# Patient Record
Sex: FEMALE | Race: BLACK OR AFRICAN AMERICAN | NOT HISPANIC OR LATINO | Employment: FULL TIME | URBAN - METROPOLITAN AREA
[De-identification: names, ages, dates, MRNs, and addresses within clinical notes are randomized per-mention and may not be internally consistent; named-entity substitution may affect disease eponyms.]

---

## 2024-11-19 ENCOUNTER — HOSPITAL ENCOUNTER (EMERGENCY)
Facility: HOSPITAL | Age: 24
Discharge: HOME | End: 2024-11-19
Attending: EMERGENCY MEDICINE
Payer: MEDICAID

## 2024-11-19 VITALS
SYSTOLIC BLOOD PRESSURE: 104 MMHG | BODY MASS INDEX: 24.8 KG/M2 | OXYGEN SATURATION: 100 % | TEMPERATURE: 98 F | HEIGHT: 63 IN | DIASTOLIC BLOOD PRESSURE: 59 MMHG | WEIGHT: 140 LBS | HEART RATE: 84 BPM | RESPIRATION RATE: 16 BRPM

## 2024-11-19 DIAGNOSIS — M79.673 FOOT PAIN: ICD-10-CM

## 2024-11-19 DIAGNOSIS — S93.601A SPRAIN OF RIGHT FOOT, INITIAL ENCOUNTER: Primary | ICD-10-CM

## 2024-11-19 LAB
B-HCG UR QL: NEGATIVE
CTP QC/QA: YES

## 2024-11-19 PROCEDURE — 99284 EMERGENCY DEPT VISIT MOD MDM: CPT | Mod: 25

## 2024-11-19 PROCEDURE — 81025 URINE PREGNANCY TEST: CPT

## 2024-11-19 RX ORDER — METHOCARBAMOL 500 MG/1
1000 TABLET, FILM COATED ORAL 3 TIMES DAILY
Qty: 30 TABLET | Refills: 0 | Status: SHIPPED | OUTPATIENT
Start: 2024-11-19 | End: 2024-11-24

## 2024-11-19 RX ORDER — KETOROLAC TROMETHAMINE 10 MG/1
10 TABLET, FILM COATED ORAL
Status: DISCONTINUED | OUTPATIENT
Start: 2024-11-19 | End: 2024-11-19 | Stop reason: HOSPADM

## 2024-11-19 RX ORDER — NAPROXEN 500 MG/1
500 TABLET ORAL 2 TIMES DAILY
Qty: 30 TABLET | Refills: 0 | Status: SHIPPED | OUTPATIENT
Start: 2024-11-19

## 2024-11-19 RX ORDER — LIDOCAINE 50 MG/G
1 PATCH TOPICAL DAILY
Qty: 15 PATCH | Refills: 0 | Status: SHIPPED | OUTPATIENT
Start: 2024-11-19

## 2024-11-19 NOTE — ED TRIAGE NOTES
Pt presents to ER with complaints of right foot pain after twisting it on yesterday. Pt rates pain 4/10 when sitting and 9/10 when walking. Pt denies any other issues at this time.

## 2024-11-19 NOTE — ED PROVIDER NOTES
Encounter Date: 11/19/2024       History     Chief Complaint   Patient presents with    Foot Pain     Pt c/o pain to right foot after rolling her ankle and then falling onto her foot yesterday.      The history is provided by the patient and medical records.   23-year-old female with no pertinent past medical history presenting to the emergency department today for evaluation of right foot pain after rolling her foot and ankle yesterday.  Reports when walking accidentally rolling her foot and since then has been having swelling and pain to the outside of her foot.  States she was able to walk however it was painful in his limping.  No medications taken for his symptoms prior to arrival.  Denies any extremity paresthesias, weakness, fever or other associated symptoms.  Review of patient's allergies indicates:   Allergen Reactions    Fire ant Hives     History reviewed. No pertinent past medical history.  History reviewed. No pertinent surgical history.  No family history on file.  Social History     Tobacco Use    Smoking status: Never    Smokeless tobacco: Never   Substance Use Topics    Alcohol use: Yes    Drug use: Yes     Types: Marijuana     Review of Systems   Constitutional:  Negative for chills, diaphoresis, fatigue and fever.   HENT:  Negative for congestion, rhinorrhea and sore throat.    Eyes:  Negative for redness and visual disturbance.   Respiratory:  Negative for cough and shortness of breath.    Cardiovascular:  Negative for chest pain, palpitations and leg swelling.   Gastrointestinal:  Negative for abdominal pain, diarrhea, nausea and vomiting.   Genitourinary:  Negative for difficulty urinating, dysuria, flank pain and frequency.   Musculoskeletal:  Positive for myalgias (right foot). Negative for arthralgias, back pain, neck pain and neck stiffness.   Skin:  Negative for rash.   Neurological:  Negative for dizziness, weakness, light-headedness and headaches.   Hematological:  Does not bruise/bleed  easily.       Physical Exam     Initial Vitals [11/19/24 1730]   BP Pulse Resp Temp SpO2   104/68 104 16 98.1 °F (36.7 °C) 98 %      MAP       --         Physical Exam    Nursing note and vitals reviewed.  Constitutional: She appears well-developed and well-nourished. She is not diaphoretic. No distress.   HENT:   Head: Normocephalic and atraumatic.   Right Ear: External ear normal.   Left Ear: External ear normal.   Nose: Nose normal. Mouth/Throat: Oropharynx is clear and moist.   Eyes: Conjunctivae and EOM are normal. Pupils are equal, round, and reactive to light. Right eye exhibits no discharge. Left eye exhibits no discharge.   Neck: Neck supple.   Normal range of motion.   Full passive range of motion without pain.     Cardiovascular:  Normal rate, regular rhythm, normal heart sounds and normal pulses.     Exam reveals no distant heart sounds and no friction rub.       Pulmonary/Chest: Effort normal and breath sounds normal. No respiratory distress.   Abdominal: Abdomen is soft. Bowel sounds are normal. She exhibits no distension, no pulsatile midline mass and no mass. There is no splenomegaly or hepatomegaly. There is no abdominal tenderness.   No right CVA tenderness.  No left CVA tenderness. There is no rebound and no guarding.   Musculoskeletal:         General: Normal range of motion.      Right shoulder: Normal.      Left shoulder: Normal.      Right elbow: Normal.      Left elbow: Normal.      Right wrist: Normal.      Left wrist: Normal.      Right hand: Normal.      Left hand: Normal.      Cervical back: Normal, full passive range of motion without pain, normal range of motion and neck supple.      Thoracic back: Normal.      Lumbar back: Normal.      Right hip: Normal.      Left hip: Normal.      Right knee: Normal.      Left knee: Normal.      Right lower leg: Normal.      Left lower leg: Normal.      Right ankle: Normal.      Left ankle: Normal.      Right foot: Normal range of motion and normal  capillary refill. Swelling (lateral aspect mid 5th MT), tenderness and bony tenderness present. No deformity, bunion, Charcot foot, foot drop, prominent metatarsal heads, laceration or crepitus. Normal pulse.      Left foot: Normal.        Feet:       Comments: Focused exam of the right foot:  Full range of motion with 5/5 strength against resistance 2+ dorsal pedal pulses.  There it was mild swelling to the lateral aspect over the 5th metatarsal with localized tenderness.     Neurological: She is alert and oriented to person, place, and time. She has normal strength. No cranial nerve deficit or sensory deficit. Gait normal.   Skin: Skin is warm and dry. Capillary refill takes less than 2 seconds. No bruising, no ecchymosis and no rash noted. No pallor.   Psychiatric: She has a normal mood and affect. Her speech is normal and behavior is normal. Thought content normal.         ED Course   Procedures  Labs Reviewed   POCT URINE PREGNANCY       Result Value    POC Preg Test, Ur Negative       Acceptable Yes            Imaging Results              X-Ray Foot Complete Right (Final result)  Result time 11/19/24 19:30:35      Final result by Clyde Howe MD (11/19/24 19:30:35)                   Impression:      1. No acute displaced fracture or dislocation of the foot.      Electronically signed by: Clyde Howe MD  Date:    11/19/2024  Time:    19:30               Narrative:    EXAMINATION:  XR FOOT COMPLETE 3 VIEW RIGHT    CLINICAL HISTORY:  . Pain in unspecified foot    TECHNIQUE:  AP, lateral, and oblique views of the right foot were performed.    COMPARISON:  None    FINDINGS:  Three views right foot.    No acute displaced fracture or dislocation of the foot.  No radiopaque foreign body.  No significant edema.                                       Medications   ketorolac tablet 10 mg (10 mg Oral Not Given 11/19/24 1800)     Medical Decision Making  23-year-old female with no pertinent past  medical history presenting to the emergency department today for evaluation of right foot pain after rolling her foot and ankle yesterday.  Reports when walking accidentally rolling her foot and since then has been having swelling and pain to the outside of her foot.  States she was able to walk however it was painful in his limping.  No medications taken for his symptoms prior to arrival.  Denies any extremity paresthesias, weakness, fever or other associated symptoms.      Patient's chart and medical history reviewed.  Patient's vitals reviewed.  They are afebrile, no respiratory distress, nontoxic-appearing in the ED.  Differential diagnosis is considered the following.  - Septic Arthritis, Gout, Osteomyelitis: considered with pain, although unlikely without overlying erythema and swelling/edema, patient is afebrile  - Fracture/Dislocation: considered with pain, imaging ordered for further evaluation  - Contusion/Sprain/Strain: considered with pain with ROM and contusion to area.  - Compartment Syndrome: unlikely with 2+ distal pulses, no pallor, no paresthesias, no woody induration.   Per my interpretation no acute osseous abnormality including fracture or dislocation of the patient's foot.  Discussed rice therapy.  Ace wrap applied.  Plan to discharged home with medications for pain as below.  At this time I'll discharge home to follow up with primary care physician in the next 1-2 days for further evaluation.  If the pain continues the pt will need to see ortho for further evaluation.  The patient is comfortable with this plan and comfortable going home at this time. After taking into careful account the historical factors and physical exam findings of the patient's presentation today, in conjunction with the empirical and objective data obtained on ED workup, no acute emergent medical condition has been identified. The patient appears to be low risk for an emergent medical condition and I feel it is safe and  appropriate at this time for the patient to be discharged to follow-up as detailed in their discharge instructions for reevaluation and possible continued outpatient workup and management. I have discussed the specifics of the workup with the patient and the patient has verbalized understanding of the details of the workup, the diagnosis, the treatment plan, and the need for outpatient follow-up.  Although the patient has no emergent etiology today this does not preclude the development of an emergent condition so in addition, I have advised the patient that they can return to the ED and/or activate EMS at any time with worsening of their symptoms, change of their symptoms, or with any other medical complaint.  The patient remained comfortable and stable during their visit in the ED.  Discharge and follow-up instructions discussed with the patient who expressed understanding and willingness to comply with my recommendations. I discussed with the patient/family the diagnosis, treatment plan, indications for return to the emergency department, and for expected follow-up. Please follow up with your primary doctor in 1-2 days and return to the ED in any new, worsening, or continued symptoms. The patient/family verbalized an understanding. The patient/family is asked if there are any questions or concerns. We discuss the case, until all issues are addressed to the patient/family's satisfaction. Patient/family understands and is agreeable to the plan.    AGNES DAMIAN PA-C    DISCLAIMER: This note was prepared with [employer] voice recognition transcription software. Garbled syntax, mangled pronouns, and other bizarre constructions may be attributed to that software system.      Amount and/or Complexity of Data Reviewed  Labs: ordered. Decision-making details documented in ED Course.  Radiology: ordered and independent interpretation performed.    Risk  Prescription drug management.               ED Course as of 11/19/24  1949   Tue Nov 19, 2024   1900 hCG Qualitative, Urine: Negative [AF]      ED Course User Index  [AF] Garrett Peña PA-C                           Clinical Impression:  Final diagnoses:  [M79.673] Foot pain  [S93.601A] Sprain of right foot, initial encounter (Primary)          ED Disposition Condition    Discharge Stable          ED Prescriptions       Medication Sig Dispense Start Date End Date Auth. Provider    naproxen (NAPROSYN) 500 MG tablet Take 1 tablet (500 mg total) by mouth 2 (two) times daily. 30 tablet 11/19/2024 -- Garrett Peña PA-C    methocarbamoL (ROBAXIN) 500 MG Tab Take 2 tablets (1,000 mg total) by mouth 3 (three) times daily. for 5 days 30 tablet 11/19/2024 11/24/2024 Garrett Peña PA-C    LIDOcaine (LIDODERM) 5 % Place 1 patch onto the skin once daily. Apply patch for 12 hours and then leave off for 12 hours 15 patch 11/19/2024 -- Garrett Peña PA-C          Follow-up Information       Follow up With Specialties Details Why Contact Info    [place] -  Schedule an appointment as soon as possible for a visit in 1 day for follow up if you do not currently have a  [address] [place] [address]  [phone]      Your primary care physician  Schedule an appointment as soon as possible for a visit in 1 day for follow up     [facility] Dept Emergency Medicine Go to  If symptoms worsen [address] [facility] [place] [id number] [phone]             Garrett Peña PA-C  11/19/24 1949

## 2024-11-19 NOTE — Clinical Note
"Calista Pina"Hussain was seen and treated in our emergency department on 11/19/2024.  She may return to work on 11/23/2024.       If you have any questions or concerns, please don't hesitate to call.      Garrett Peña PA-C"

## 2024-11-20 NOTE — DISCHARGE INSTRUCTIONS
Apply a compressive ACE bandage. Rest and elevate the affected painful area.  Apply cold compresses intermittently as needed.  As pain recedes, begin normal activities slowly as tolerated.  Call if symptoms persist.  Thank you for coming to our Emergency Department today. It is important to remember that some problems or medical conditions are difficult to diagnose and may not be found or addressed during your Emergency Department visit.  These conditions often start with non-specific symptoms and can only be diagnosed on follow up visits with your primary care physician or specialist when the symptoms continue or change. Please remember that all medical conditions can change, and we cannot predict how you will be feeling tomorrow or the next day. Return to the ER with any questions/concerns, new/concerning symptoms including fever, chest pain, shortness of breath, loss of consciousness, dizziness, weakness, worsening symptoms, failure to improve, or any other concerns. Also, please follow up with your Primary Care Physician and/or Pediatrician in the next 1-2 days to review your ED visit in entirety and for re-evaluation.   Be sure to follow up with your primary care doctor and review all labs/imaging/tests that were performed during your ER visit with them. It is very common for us to identify non-emergent incidental findings which must be followed up with your primary care physician.  Some labs/imaging/tests may be outside of the normal range, and require non-emergent follow-up and/or further investigation/treatment/procedures/testing to help diagnose/exclude/prevent complications or other potentially serious medical conditions. Some abnormalities may not have been discussed or addressed during your ER visit. Some lab results may not return during your ER visit but can be accessible by downloading the free [facility] faisal or by visiting [email or web address] . It is important for you to review all  labs/imaging/tests which are outside of the normal range with your physician.  An ER visit does not replace a primary care visit, and many screening tests or follow-up tests cannot be ordered by an ER doctor or performed by the ER. Some tests may even require pre-approval.  If you do not have a primary care doctor, you may contact the one listed on your discharge paperwork or you may also call the [facility] Appointment Desk at [phone] , or [address] [place] [address] at  [phone] to schedule an appointment, or establish care with a primary care doctor or even a specialist and to obtain information about local resources. It is important to your health that you have a primary care doctor.  Please take all medications as directed. We have done our best to select a medication for you that will treat your condition however, all medications may potentially have side-effects and it is impossible to predict which medications may give you side-effects or what those side-effects (if any) those medications may give you.  If you feel that you are having a negative effect or side-effect of any medication you should stop taking those medications immediately and seek medical attention. If you feel that you are having a life-threatening reaction call 911.  Do not drive, swim, climb to height, take a bath, operate heavy machinery, drink alcohol or take potentially sedating medications, sign any legal documents or make any important decisions for 24 hours if you have received any pain medications, sedatives or mood altering drugs during your ER visit or within 24 hours of taking them if they have been prescribed to you.   You can find additional resources for Dentists, hearing aids, durable medical equipment, low cost pharmacies and other resources at [email or web address]  Patient agrees with this plan. Discussed with her strict return precautions, they verbalized understanding. Patient is stable for discharge.   § Please take  all medication as prescribed.

## 2025-01-31 ENCOUNTER — HOSPITAL ENCOUNTER (EMERGENCY)
Facility: HOSPITAL | Age: 25
Discharge: HOME | End: 2025-01-31
Attending: EMERGENCY MEDICINE
Payer: COMMERCIAL

## 2025-01-31 VITALS
WEIGHT: 140 LBS | HEART RATE: 89 BPM | TEMPERATURE: 99 F | RESPIRATION RATE: 20 BRPM | OXYGEN SATURATION: 98 % | SYSTOLIC BLOOD PRESSURE: 110 MMHG | BODY MASS INDEX: 24.8 KG/M2 | HEIGHT: 63 IN | DIASTOLIC BLOOD PRESSURE: 65 MMHG

## 2025-01-31 DIAGNOSIS — A08.4 VIRAL GASTROENTERITIS: Primary | ICD-10-CM

## 2025-01-31 LAB
ALBUMIN SERPL BCP-MCNC: 3.9 G/DL (ref 3.5–5.2)
ALP SERPL-CCNC: 70 U/L (ref 40–150)
ALT SERPL W/O P-5'-P-CCNC: 12 U/L (ref 10–44)
ANION GAP SERPL CALC-SCNC: 6 MMOL/L (ref 8–16)
AST SERPL-CCNC: 19 U/L (ref 10–40)
B-HCG UR QL: NEGATIVE
BASOPHILS # BLD AUTO: 0.02 K/UL (ref 0–0.2)
BASOPHILS NFR BLD: 0.4 % (ref 0–1.9)
BILIRUB SERPL-MCNC: 0.6 MG/DL (ref 0.1–1)
BILIRUB UR QL STRIP: NEGATIVE
BUN SERPL-MCNC: 8 MG/DL (ref 6–20)
CALCIUM SERPL-MCNC: 9.1 MG/DL (ref 8.7–10.5)
CHLORIDE SERPL-SCNC: 105 MMOL/L (ref 95–110)
CLARITY UR: CLEAR
CO2 SERPL-SCNC: 26 MMOL/L (ref 23–29)
COLOR UR: YELLOW
CREAT SERPL-MCNC: 0.8 MG/DL (ref 0.5–1.4)
CTP QC/QA: YES
DIFFERENTIAL METHOD BLD: ABNORMAL
EOSINOPHIL # BLD AUTO: 0 K/UL (ref 0–0.5)
EOSINOPHIL NFR BLD: 0.2 % (ref 0–8)
ERYTHROCYTE [DISTWIDTH] IN BLOOD BY AUTOMATED COUNT: 13.7 % (ref 11.5–14.5)
EST. GFR  (NO RACE VARIABLE): >60 ML/MIN/1.73 M^2
GLUCOSE SERPL-MCNC: 101 MG/DL (ref 70–110)
GLUCOSE UR QL STRIP: NEGATIVE
HCT VFR BLD AUTO: 40.3 % (ref 37–48.5)
HGB BLD-MCNC: 14.1 G/DL (ref 12–16)
HGB UR QL STRIP: ABNORMAL
IMM GRANULOCYTES # BLD AUTO: 0.01 K/UL (ref 0–0.04)
IMM GRANULOCYTES NFR BLD AUTO: 0.2 % (ref 0–0.5)
KETONES UR QL STRIP: NEGATIVE
LEUKOCYTE ESTERASE UR QL STRIP: NEGATIVE
LIPASE SERPL-CCNC: 5 U/L (ref 4–60)
LYMPHOCYTES # BLD AUTO: 1.1 K/UL (ref 1–4.8)
LYMPHOCYTES NFR BLD: 23.7 % (ref 18–48)
MCH RBC QN AUTO: 26.7 PG (ref 27–31)
MCHC RBC AUTO-ENTMCNC: 35 G/DL (ref 32–36)
MCV RBC AUTO: 76 FL (ref 82–98)
MONOCYTES # BLD AUTO: 0.5 K/UL (ref 0.3–1)
MONOCYTES NFR BLD: 11.4 % (ref 4–15)
NEUTROPHILS # BLD AUTO: 3 K/UL (ref 1.8–7.7)
NEUTROPHILS NFR BLD: 64.1 % (ref 38–73)
NITRITE UR QL STRIP: NEGATIVE
NRBC BLD-RTO: 0 /100 WBC
PH UR STRIP: 7 [PH] (ref 5–8)
PLATELET # BLD AUTO: 243 K/UL (ref 150–450)
PMV BLD AUTO: 10.3 FL (ref 9.2–12.9)
POC MOLECULAR INFLUENZA A AGN: NEGATIVE
POC MOLECULAR INFLUENZA B AGN: NEGATIVE
POTASSIUM SERPL-SCNC: 3.5 MMOL/L (ref 3.5–5.1)
PROT SERPL-MCNC: 7.7 G/DL (ref 6–8.4)
PROT UR QL STRIP: ABNORMAL
RBC # BLD AUTO: 5.28 M/UL (ref 4–5.4)
SARS-COV-2 RDRP RESP QL NAA+PROBE: NEGATIVE
SODIUM SERPL-SCNC: 137 MMOL/L (ref 136–145)
SP GR UR STRIP: 1.01 (ref 1–1.03)
URN SPEC COLLECT METH UR: ABNORMAL
UROBILINOGEN UR STRIP-ACNC: NEGATIVE EU/DL
WBC # BLD AUTO: 4.73 K/UL (ref 3.9–12.7)

## 2025-01-31 PROCEDURE — 96361 HYDRATE IV INFUSION ADD-ON: CPT

## 2025-01-31 PROCEDURE — 99284 EMERGENCY DEPT VISIT MOD MDM: CPT | Mod: 25

## 2025-01-31 PROCEDURE — 87635 SARS-COV-2 COVID-19 AMP PRB: CPT | Performed by: EMERGENCY MEDICINE

## 2025-01-31 PROCEDURE — 96375 TX/PRO/DX INJ NEW DRUG ADDON: CPT

## 2025-01-31 PROCEDURE — 81025 URINE PREGNANCY TEST: CPT

## 2025-01-31 PROCEDURE — 80053 COMPREHEN METABOLIC PANEL: CPT

## 2025-01-31 PROCEDURE — 81003 URINALYSIS AUTO W/O SCOPE: CPT

## 2025-01-31 PROCEDURE — 85025 COMPLETE CBC W/AUTO DIFF WBC: CPT

## 2025-01-31 PROCEDURE — 83690 ASSAY OF LIPASE: CPT

## 2025-01-31 PROCEDURE — 25000003 PHARM REV CODE 250

## 2025-01-31 PROCEDURE — 63600175 PHARM REV CODE 636 W HCPCS

## 2025-01-31 PROCEDURE — 87502 INFLUENZA DNA AMP PROBE: CPT

## 2025-01-31 PROCEDURE — 96374 THER/PROPH/DIAG INJ IV PUSH: CPT

## 2025-01-31 RX ORDER — FAMOTIDINE 20 MG/1
20 TABLET, FILM COATED ORAL 2 TIMES DAILY
Qty: 20 TABLET | Refills: 0 | Status: SHIPPED | OUTPATIENT
Start: 2025-01-31 | End: 2025-02-10

## 2025-01-31 RX ORDER — ONDANSETRON HYDROCHLORIDE 2 MG/ML
4 INJECTION, SOLUTION INTRAVENOUS
Status: COMPLETED | OUTPATIENT
Start: 2025-01-31 | End: 2025-01-31

## 2025-01-31 RX ORDER — ONDANSETRON 4 MG/1
4 TABLET, ORALLY DISINTEGRATING ORAL EVERY 6 HOURS PRN
Qty: 14 TABLET | Refills: 0 | Status: SHIPPED | OUTPATIENT
Start: 2025-01-31

## 2025-01-31 RX ORDER — SODIUM CHLORIDE 9 MG/ML
1000 INJECTION, SOLUTION INTRAVENOUS
Status: COMPLETED | OUTPATIENT
Start: 2025-01-31 | End: 2025-01-31

## 2025-01-31 RX ORDER — FAMOTIDINE 10 MG/ML
20 INJECTION INTRAVENOUS
Status: COMPLETED | OUTPATIENT
Start: 2025-01-31 | End: 2025-01-31

## 2025-01-31 RX ORDER — ALUMINUM HYDROXIDE, MAGNESIUM HYDROXIDE, AND SIMETHICONE 1200; 120; 1200 MG/30ML; MG/30ML; MG/30ML
30 SUSPENSION ORAL
Status: COMPLETED | OUTPATIENT
Start: 2025-01-31 | End: 2025-01-31

## 2025-01-31 RX ADMIN — ALUMINUM HYDROXIDE, MAGNESIUM HYDROXIDE, AND SIMETHICONE 30 ML: 1200; 120; 1200 SUSPENSION ORAL at 10:01

## 2025-01-31 RX ADMIN — ONDANSETRON 4 MG: 2 INJECTION INTRAMUSCULAR; INTRAVENOUS at 10:01

## 2025-01-31 RX ADMIN — SODIUM CHLORIDE 1000 ML: 9 INJECTION, SOLUTION INTRAVENOUS at 10:01

## 2025-01-31 RX ADMIN — FAMOTIDINE 20 MG: 10 INJECTION, SOLUTION INTRAVENOUS at 10:01

## 2025-01-31 NOTE — ED PROVIDER NOTES
Encounter Date: 1/31/2025    SCRIBE #1 NOTE: I, Rubina Wick, am scribing for, and in the presence of,  Brodie Mccormack PA-C. I have scribed the following portions of the note - Other sections scribed: HPI, ROS, PE.       History     Chief Complaint   Patient presents with    Abdominal Pain     Pt to ER with reports of abd pain with N/V/D x 2 days. Pt reports + dark stool. Pt reports taking kaopectate for symptoms.       Patient is a 24 y.o. female with no past medical history who presents to the Emergency Department for evaluation of intermittent epigastric abdominal pain x 2 days.  She also reports associated symptoms of nausea and 8-10 daily episodes of vomiting and diarrhea. She reports last vomiting episode was at 0730 today. She also reports having dark/black stool this morning but states she has been taking kaopectate for symptoms prior to this. Patient reports eating red beans and rice prior to onset of symptoms. She denies worsening pain with food intake and states she has been trying to drink lots of fluids. She denies history of gallstones, pancreatitis, heavy NSAID use, acid reflux/indigestion, peptic ulcer disease, abdominal surgeries, bowel obstruction. She denies blood in the stool. She denies previous consultation with a GI specialist, EGD, or colonoscopy. She denies crohn's disease or ulcerative colitis. She reports marijuana use but denies any other recreational drug use, states she smokes daily. Denies alcohol consumption or tobacco use. She denies dysuria or hematuria. Denies cough, congestion, or any recent illness.       The history is provided by the patient. No  was used.     Review of patient's allergies indicates:   Allergen Reactions    Fire ant Hives     History reviewed. No pertinent past medical history.  History reviewed. No pertinent surgical history.  No family history on file.  Social History     Tobacco Use    Smoking status: Never    Smokeless tobacco: Never    Substance Use Topics    Alcohol use: Yes    Drug use: Yes     Types: Marijuana     Review of Systems   Constitutional:  Negative for chills and fever.   HENT:  Negative for congestion, ear pain, sore throat and trouble swallowing.    Respiratory:  Negative for cough and shortness of breath.    Cardiovascular:  Negative for chest pain and palpitations.   Gastrointestinal:  Positive for abdominal pain (epigastric), diarrhea, nausea and vomiting. Negative for blood in stool.   Genitourinary:  Negative for dysuria, flank pain, frequency, hematuria, vaginal bleeding and vaginal discharge.   Musculoskeletal:  Negative for back pain, neck pain and neck stiffness.   Neurological:  Negative for dizziness, weakness, light-headedness and headaches.       Physical Exam     Initial Vitals [01/31/25 1001]   BP Pulse Resp Temp SpO2   118/67 105 20 98.7 °F (37.1 °C) 97 %      MAP       --         Physical Exam    Nursing note and vitals reviewed.  Constitutional: She appears well-developed and well-nourished.   HENT:   Head: Normocephalic and atraumatic.   Right Ear: External ear normal.   Left Ear: External ear normal.   Neck: Carotid bruit is not present.   Normal range of motion.  Cardiovascular:  Normal rate, regular rhythm, normal heart sounds and intact distal pulses.     Exam reveals no gallop and no friction rub.       No murmur heard.  Pulmonary/Chest: Breath sounds normal. No respiratory distress. She has no wheezes. She has no rhonchi. She has no rales.   Abdominal: Abdomen is soft. Bowel sounds are normal. She exhibits no distension. There is no abdominal tenderness. There is no rebound and no guarding.   Musculoskeletal:         General: Normal range of motion.      Cervical back: Normal range of motion.     Neurological: She is alert and oriented to person, place, and time. GCS score is 15. GCS eye subscore is 4. GCS verbal subscore is 5. GCS motor subscore is 6.   Psychiatric: She has a normal mood and affect.          ED Course   Procedures  Labs Reviewed   CBC W/ AUTO DIFFERENTIAL - Abnormal       Result Value    WBC 4.73      RBC 5.28      Hemoglobin 14.1      Hematocrit 40.3      MCV 76 (*)     MCH 26.7 (*)     MCHC 35.0      RDW 13.7      Platelets 243      MPV 10.3      Immature Granulocytes 0.2      Gran # (ANC) 3.0      Immature Grans (Abs) 0.01      Lymph # 1.1      Mono # 0.5      Eos # 0.0      Baso # 0.02      nRBC 0      Gran % 64.1      Lymph % 23.7      Mono % 11.4      Eosinophil % 0.2      Basophil % 0.4      Differential Method Automated     COMPREHENSIVE METABOLIC PANEL - Abnormal    Sodium 137      Potassium 3.5      Chloride 105      CO2 26      Glucose 101      BUN 8      Creatinine 0.8      Calcium 9.1      Total Protein 7.7      Albumin 3.9      Total Bilirubin 0.6      Alkaline Phosphatase 70      AST 19      ALT 12      eGFR >60      Anion Gap 6 (*)    URINALYSIS, REFLEX TO URINE CULTURE - Abnormal    Specimen UA Urine, Clean Catch      Color, UA Yellow      Appearance, UA Clear      pH, UA 7.0      Specific Gravity, UA 1.010      Protein, UA Trace (*)     Glucose, UA Negative      Ketones, UA Negative      Bilirubin (UA) Negative      Occult Blood UA Trace (*)     Nitrite, UA Negative      Urobilinogen, UA Negative      Leukocytes, UA Negative      Narrative:     Specimen Source->Urine   LIPASE    Lipase 5     SARS-COV-2 RDRP GENE    POC Rapid COVID Negative       Acceptable Yes     POCT INFLUENZA A/B MOLECULAR    POC Molecular Influenza A Ag Negative      POC Molecular Influenza B Ag Negative       Acceptable Yes     POCT URINE PREGNANCY    POC Preg Test, Ur Negative       Acceptable Yes            Imaging Results    None          Medications   0.9% NaCl infusion (0 mLs Intravenous Stopped 1/31/25 1132)   ondansetron injection 4 mg (4 mg Intravenous Given 1/31/25 1042)   famotidine (PF) injection 20 mg (20 mg Intravenous Given 1/31/25 1042)    aluminum-magnesium hydroxide-simethicone 200-200-20 mg/5 mL suspension 30 mL (30 mLs Oral Given 1/31/25 1034)     Medical Decision Making  This is an emergent evaluation of a 24 y.o. female with no past medical history who presents to the Emergency Department for evaluation of abdominal pain, N/V/D x 2 days.     Patient looks well clinically. Regular rate rhythm without murmurs.  No carotid bruits appreciated on exam. Lungs are clear to auscultation bilaterally.  Abdomen is soft, nontender, non distended, with normal bowel sounds.     Differential diagnosis includes but is not limited to viral gastroenteritis, COVID, flu, other viral syndrome, dehydration, electrolyte derangement.  Considered but doubt an acute abdomen given well-appearing patient, normal vital signs, serial abdominal exam.     Workup initiated with basic labs, lipase, viral swabs, urinalysis, UPT.  Ordered fluids, Zofran, Pepcid.  Vital signs, chart, labs, and/or imaging were all reviewed.  See ED course below and interpretations above. My overall impression is viral gastroenteritis. Serial abdominal exam benign. Will discharge home with zofran, pepcid with PCP follow up. Patient is very well appearing, and in no acute distress. Vital signs are reassuring here in the emergency department, patient is afebrile, breathing comfortable, satting 97 % on room air. Patient/Caregiver is stable for discharge at this time.  Patient/Caregiver was informed of results and plan of care. Patient/Caregiver verbalized understanding of care plan. All questions and concerns were addressed. Discussed strict return precautions with the patient/caregiver. Instructed follow up with primary care provider within 1 week.      Brodie Mccormack PA-C    DISCLAIMER: This note was prepared with [employer] voice recognition transcription software. Garbled syntax, mangled pronouns, and other bizarre constructions may be attributed to that software system.       Amount and/or Complexity  of Data Reviewed  Labs: ordered. Decision-making details documented in ED Course.    Risk  OTC drugs.  Prescription drug management.            Scribe Attestation:   Scribe #1: I performed the above scribed service and the documentation accurately describes the services I performed. I attest to the accuracy of the note.        ED Course as of 01/31/25 1259   Fri Jan 31, 2025   1007 BP: 118/67 [TM]   1008 Temp: 98.7 °F (37.1 °C) [TM]   1008 Pulse: 105 [TM]   1008 Resp: 20 [TM]   1008 SpO2: 97 % [TM]   1106 CBC auto differential(!)  CBC is without leukocytosis or anemia.  Normal platelet count. [TM]   1107 Urinalysis, Reflex to Urine Culture Urine, Clean Catch(!)  Urinalysis showing no signs of infection, negative for nitrites or leukocytes. [TM]   1158 Comprehensive metabolic panel(!)  CMP with normal renal function, no electrolyte derangement. [TM]   1158 Lipase  Lipase within normal limits, pancreatitis unlikely. [TM]   1204 Patient reports feeling improved.  No further vomiting in the ED.  Tolerating p.o..  Will discharge home Zofran, Pepcid with PCP follow-up. [TM]      ED Course User Index  [TM] Brodie Mccormack PA-C                         I, Brodie Mccormack PA-C, personally performed the services described in this documentation. All medical record entries made by the scribe were at my direction and in my presence. I have reviewed the chart and agree that the record reflects my personal performance and is accurate and complete.      DISCLAIMER: This note was prepared with [employer] voice recognition transcription software. Garbled syntax, mangled pronouns, and other bizarre constructions may be attributed to that software system.    Clinical Impression:  Final diagnoses:  [A08.4] Viral gastroenteritis (Primary)          ED Disposition Condition    Discharge Stable          ED Prescriptions       Medication Sig Dispense Start Date End Date Auth. Provider    ondansetron (ZOFRAN-ODT) 4 MG TbDL Take 1 tablet (4 mg  total) by mouth every 6 (six) hours as needed. 14 tablet 1/31/2025 -- Brodie Mccormack PA-C    famotidine (PEPCID) 20 MG tablet Take 1 tablet (20 mg total) by mouth 2 (two) times daily. for 10 days 20 tablet 1/31/2025 2/10/2025 Brodie Mccormack PA-C          Follow-up Information       Follow up With Specialties Details Why Contact Info    [facility] - Emergency Dept Emergency Medicine Go to  As needed, If symptoms worsen, or new symptoms develop [address] [facility] [place] [address] [phone]    Primary care doctor  Schedule an appointment as soon as possible for a visit in 3 days               Brodie Mccormack PA-C  01/31/25 5234     0 = independent

## 2025-01-31 NOTE — DISCHARGE INSTRUCTIONS
You were seen in the emergency department today for viral gastroenteritis.  Please take all medications as prescribed and as we discussed.  Follow-up with specialist if instructed to do so.  It is important to remember that some problems are difficult to diagnose and may not be found during your Emergency Department visit. Be sure to follow up with your primary care doctor and review all labs/imaging/tests that were performed during this visit with them. Some labs/tests may be outside of the normal range and require non-emergent follow-up and further investigation to help diagnose/exclude/prevent complications or other medical conditions. Return to the emergency department for any new or worsening symptoms. Thank you for allowing me to care for you today, it was my pleasure. I hope you get to feeling better soon!

## 2025-01-31 NOTE — Clinical Note
"Calista Watsonbeny Nixon was seen and treated in our emergency department on 1/31/2025.  She may return to work on 02/03/2025.       If you have any questions or concerns, please don't hesitate to call.      Brodie Mccormack PA-C"